# Patient Record
Sex: MALE | Race: WHITE
[De-identification: names, ages, dates, MRNs, and addresses within clinical notes are randomized per-mention and may not be internally consistent; named-entity substitution may affect disease eponyms.]

---
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
## 2022-03-24 ENCOUNTER — HOSPITAL ENCOUNTER (EMERGENCY)
Dept: HOSPITAL 97 - ER | Age: 22
Discharge: HOME | End: 2022-03-24
Payer: COMMERCIAL

## 2022-03-24 VITALS — SYSTOLIC BLOOD PRESSURE: 128 MMHG | DIASTOLIC BLOOD PRESSURE: 72 MMHG | TEMPERATURE: 98.5 F

## 2022-03-24 VITALS — OXYGEN SATURATION: 100 %

## 2022-03-24 DIAGNOSIS — Z88.5: ICD-10-CM

## 2022-03-24 DIAGNOSIS — F41.9: ICD-10-CM

## 2022-03-24 DIAGNOSIS — S06.0X9A: Primary | ICD-10-CM

## 2022-03-24 DIAGNOSIS — Z88.8: ICD-10-CM

## 2022-03-24 DIAGNOSIS — F17.210: ICD-10-CM
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
## 2022-03-24 PROCEDURE — 70486 CT MAXILLOFACIAL W/O DYE: CPT

## 2022-03-24 PROCEDURE — 70450 CT HEAD/BRAIN W/O DYE: CPT

## 2022-03-24 PROCEDURE — 76377 3D RENDER W/INTRP POSTPROCES: CPT

## 2022-03-24 PROCEDURE — 72125 CT NECK SPINE W/O DYE: CPT

## 2022-03-24 PROCEDURE — 99284 EMERGENCY DEPT VISIT MOD MDM: CPT
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
## 2022-03-24 NOTE — XMS REPORT
Continuity of Care Document

                            Created on:2022



Patient:MATTHEW ROLON

Sex:Male

:2000

External Reference #:[id number]





Demographics







                          Address                   [address] [place] [address]

 

                          Home Phone                [phone]

 

                          Work Phone                [phone]

 

                          Email Address             [email or web address]

 

                          Preferred Language        Unknown

 

                          Marital Status            Unknown

 

                          Worship Affiliation     Unknown

 

                          Race                      Unknown

 

                          Additional Race(s)        Unavailable

 

                          Ethnic Group              Unknown









Author







                          Organization              [facility]

t

 

                          Address                   [address]



                                                    [place] [address]

 

                          Phone                     [phone]









Care Team Providers







                    Name                Role                Phone

 

                    HOPE                Attending Clinician Unavailable









Problems

This patient has no known problems.



Allergies, Adverse Reactions, Alerts

This patient has no known allergies or adverse reactions.



Medications

This patient has no known medications.



Procedures

This patient has no known procedures.



Encounters







        Start   End     Encounter Admission Attending Care    Care    Encounter 

Source



        Date/Time Date/Time Type    Type    Clinicians Facility Department ID   

   

 

        2022-02-10 2022-02-10 Outpatient         [facility]     [id number]

[id number]



        00:00:00 00:00:00                 [place]                 [address]



                                                                        [address]

 

        2022 Outpatient         [facility]     [id number]

[id number]



        00:00:00 00:00:00                 [place]                 [address]

[address]



                                                                        st

 

        2022 Outpatient                 [facility]     [id number]

[address]



        00:00:00 00:00:00                                         [address]

[address]



                                                                        st

 

        2022 Outpatient         [facility]     [id number]

[address]



        00:00:00 00:00:00                 [place]                 [address]

i



                                                                        st







Results

This patient has no known results.
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
